# Patient Record
Sex: MALE | Race: ASIAN | NOT HISPANIC OR LATINO | URBAN - METROPOLITAN AREA
[De-identification: names, ages, dates, MRNs, and addresses within clinical notes are randomized per-mention and may not be internally consistent; named-entity substitution may affect disease eponyms.]

---

## 2024-10-30 ENCOUNTER — EMERGENCY (EMERGENCY)
Facility: HOSPITAL | Age: 24
LOS: 1 days | Discharge: HOME | End: 2024-10-30
Attending: EMERGENCY MEDICINE | Admitting: EMERGENCY MEDICINE
Payer: MEDICAID

## 2024-10-30 VITALS
RESPIRATION RATE: 18 BRPM | OXYGEN SATURATION: 99 % | HEART RATE: 92 BPM | HEIGHT: 69 IN | TEMPERATURE: 98 F | DIASTOLIC BLOOD PRESSURE: 85 MMHG | SYSTOLIC BLOOD PRESSURE: 141 MMHG | WEIGHT: 169.98 LBS

## 2024-10-30 PROCEDURE — 99284 EMERGENCY DEPT VISIT MOD MDM: CPT

## 2024-10-30 RX ORDER — PREDNISONE 5 MG/1
3 TABLET ORAL
Qty: 15 | Refills: 0
Start: 2024-10-30 | End: 2024-11-03

## 2024-10-30 RX ORDER — FAMOTIDINE 40 MG
20 TABLET ORAL ONCE
Refills: 0 | Status: COMPLETED | OUTPATIENT
Start: 2024-10-30 | End: 2024-10-30

## 2024-10-30 RX ORDER — PREDNISONE 5 MG/1
60 TABLET ORAL ONCE
Refills: 0 | Status: COMPLETED | OUTPATIENT
Start: 2024-10-30 | End: 2024-10-30

## 2024-10-30 RX ORDER — FAMOTIDINE 40 MG
1 TABLET ORAL
Qty: 20 | Refills: 0
Start: 2024-10-30 | End: 2024-11-08

## 2024-10-30 RX ORDER — DIPHENHYDRAMINE HCL 12.5MG/5ML
50 LIQUID (ML) ORAL ONCE
Refills: 0 | Status: COMPLETED | OUTPATIENT
Start: 2024-10-30 | End: 2024-10-30

## 2024-10-30 RX ADMIN — PREDNISONE 60 MILLIGRAM(S): 5 TABLET ORAL at 12:14

## 2024-10-30 RX ADMIN — Medication 50 MILLIGRAM(S): at 12:14

## 2024-10-30 RX ADMIN — Medication 20 MILLIGRAM(S): at 12:15

## 2024-10-30 NOTE — ED PROVIDER NOTE - NSFOLLOWUPINSTRUCTIONS_ED_ALL_ED_FT
-- You should update your primary care physician on your Emergency Department visit and follow up with them.  If you do not have a physician or have difficulty following up, please call: [phone] to obtain a [facility] doctor or specialist who can provide follow up.    -- Return to the ER for worsening or persistent symptoms, and/or ANY NEW OR CONCERNING SYMPTOMS.

## 2024-10-30 NOTE — ED ADULT NURSE NOTE - OBJECTIVE STATEMENT
Pt arrives to intake. Pt is A and Ox4 and ambulatory. Pt arrives to the ED complaining of hives x2-3 days. Pt denies allergies at this time. Airway is patent, respirations are even and unlabored. Pt denies chest pain, shortness of breath, headaches, dizziness, numbness and tingling, fever and chills, nausea/vomitting/diarrhea. Pt medicated per MAR. Plan of care ongoing, safety maintained.

## 2024-10-30 NOTE — ED PROVIDER NOTE - OBJECTIVE STATEMENT
pt reports diffuse rash with itching over the past few days but acutely worse over the last 24 hours. pt reports severe itching. no known allergies, not on any medications. denies any n/v/d, denies any abd pain.

## 2024-10-30 NOTE — ED PROVIDER NOTE - PATIENT PORTAL LINK FT
You can access the FollowMyHealth Patient Portal offered by [facility] by registering at the following website: [email or web address]. By joining [employer]’s FollowMyHealth portal, you will also be able to view your health information using other applications (apps) compatible with our system.

## 2024-10-30 NOTE — ED ADULT TRIAGE NOTE - CHIEF COMPLAINT QUOTE
Pt arrives with hives to face and torso, pt looks uncomfortable itching and scratching. pt states he  has had this x one week  no relief with medication given by his doctor. Pt denies sob co intermittent stomach discomfort with no nausea no vomiting.

## 2024-10-30 NOTE — ED PROVIDER NOTE - PHYSICAL EXAMINATION
Gen: alert, NAD  HEENT:  NC/AT, PERR, OP clear without lesions  CV:  well perfused  Pulm:  normal RR, breathing comfortably  Abd: s/nt/nd  MSK: moving all extremities  Neuro:  non-focal  Skin: diffuse maculopapular rash w hives  Psych: AOx3